# Patient Record
Sex: MALE | Race: OTHER
[De-identification: names, ages, dates, MRNs, and addresses within clinical notes are randomized per-mention and may not be internally consistent; named-entity substitution may affect disease eponyms.]

---

## 2021-07-24 ENCOUNTER — HOSPITAL ENCOUNTER (EMERGENCY)
Dept: HOSPITAL 12 - ER | Age: 46
Discharge: HOME | End: 2021-07-24
Payer: COMMERCIAL

## 2021-07-24 VITALS — BODY MASS INDEX: 27.4 KG/M2 | HEIGHT: 69 IN | WEIGHT: 185 LBS

## 2021-07-24 DIAGNOSIS — Z79.01: ICD-10-CM
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
## 2021-07-24 DIAGNOSIS — S60.221A: Primary | ICD-10-CM
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
## 2021-07-24 DIAGNOSIS — Z86.718: ICD-10-CM

## 2021-07-24 DIAGNOSIS — R03.0: ICD-10-CM

## 2021-07-24 PROCEDURE — A4663 DIALYSIS BLOOD PRESSURE CUFF: HCPCS

## 2021-07-24 NOTE — NUR
Pt states he was trying to separate socket from , wrench was tight and 
then popped off, pt hit back of right hand.  Pt taking Xarelto and is concerned 
about possible internal bleeding in hand.  Minimal pain 2/10, PMS intact, cap 
refill < 2 sec.



Gave pt ice pack for hand.

## 2021-10-01 ENCOUNTER — HOSPITAL ENCOUNTER (EMERGENCY)
Dept: HOSPITAL 12 - ER | Age: 46
Discharge: HOME | End: 2021-10-01
Payer: COMMERCIAL

## 2021-10-01 VITALS — HEIGHT: 67 IN | BODY MASS INDEX: 25.11 KG/M2 | WEIGHT: 160 LBS

## 2021-10-01 VITALS — SYSTOLIC BLOOD PRESSURE: 128 MMHG | DIASTOLIC BLOOD PRESSURE: 82 MMHG
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
## 2021-10-01 DIAGNOSIS — Z86.718: ICD-10-CM

## 2021-10-01 DIAGNOSIS — Z79.01: ICD-10-CM
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
## 2021-10-01 DIAGNOSIS — S37.30XA: Primary | ICD-10-CM

## 2021-10-01 PROCEDURE — A4663 DIALYSIS BLOOD PRESSURE CUFF: HCPCS

## 2021-10-01 NOTE — NUR
EDMD requested Uro consult.  Dr. Robert Martinez called at [phone].  Dr. Martinez answered right away and was  transfered to Dr. Scales for 
consultation.  Dr. Martinez believes it to be a urethral Lac due to pt 
recieving agressive oral sex from his wife.  Dr. Inman advised to have pt 
see him at his office or to seek a urology appt so that a urologist could place 
a hemostatic agent on the lac.

## 2021-10-01 NOTE — NUR
EDMD at bedside to assess pt.  EDMD requested lab work from [facility], which pt 
had just left from because he was not happy with their care.

## 2021-10-01 NOTE — NUR
Pt awaiting EDMD to assess condition.  Pt in gown, on [facility] with site exposed 
and resting comfortably awaiting EDMD.

## 2021-10-01 NOTE — NUR
Pt DCed home with aftercare instructions to make an appt to see his PMD for 
urology referral or to contact [facility] for an in network urology 
referral.  Pt acknowledge understanding of instructions and said he will call 
his PMD in the AM.  Pt has great color, appearance and temp, VSS, PE WNL, 
oxygenating and perfusing well.  Denies any pain and has no s/sx of discomfort 
or distress.

## 2021-10-01 NOTE — NUR
Pt brought back to room 2B via walking by triage RN.  In with pt to place in 
gown and on gurney and to introduce myself.  Pt has great color, appearance and 
temp.  VSS, PE WNL, mild pain 2/10. Lungs clear bilat, NSR, RRR normal s1s2 
without ectopy, m/g/r, oxygenating and perfusing well. Pt states that he is 
otherwise completely health with no health issues what so ever. No s/sx of 
distress present.

## 2021-10-29 ENCOUNTER — HOSPITAL ENCOUNTER (EMERGENCY)
Dept: HOSPITAL 12 - ER | Age: 46
Discharge: LEFT BEFORE DISCHARGE | End: 2021-10-29
Payer: SELF-PAY

## 2021-10-29 DIAGNOSIS — Z53.21: Primary | ICD-10-CM

## 2021-10-29 NOTE — NUR
Patient was called to be traiged but was not present in the waiting room or 
outside of ER. Patient was not traiged or seen by ERMD.